# Patient Record
Sex: MALE | Race: WHITE
[De-identification: names, ages, dates, MRNs, and addresses within clinical notes are randomized per-mention and may not be internally consistent; named-entity substitution may affect disease eponyms.]

---

## 2018-11-21 ENCOUNTER — HOSPITAL ENCOUNTER (OUTPATIENT)
Dept: HOSPITAL 47 - RADUSMAIN | Age: 78
Discharge: HOME | End: 2018-11-21
Attending: PHYSICIAN ASSISTANT
Payer: MEDICARE

## 2018-11-21 DIAGNOSIS — I80.9: Primary | ICD-10-CM

## 2018-11-21 DIAGNOSIS — T84.039D: ICD-10-CM

## 2018-11-21 DIAGNOSIS — M25.551: ICD-10-CM

## 2018-11-21 DIAGNOSIS — M79.89: ICD-10-CM

## 2018-11-21 DIAGNOSIS — Z96.652: ICD-10-CM

## 2018-12-04 ENCOUNTER — HOSPITAL ENCOUNTER (OUTPATIENT)
Dept: HOSPITAL 47 - LABPAT | Age: 78
Discharge: HOME | End: 2018-12-04
Attending: ORTHOPAEDIC SURGERY
Payer: MEDICARE

## 2018-12-04 DIAGNOSIS — Z01.818: Primary | ICD-10-CM

## 2018-12-04 DIAGNOSIS — Z01.812: ICD-10-CM

## 2018-12-04 DIAGNOSIS — Z79.01: ICD-10-CM

## 2018-12-04 LAB
ALBUMIN SERPL-MCNC: 4 G/DL (ref 3.5–5)
ALP SERPL-CCNC: 73 U/L (ref 38–126)
ALT SERPL-CCNC: 22 U/L (ref 21–72)
ANION GAP SERPL CALC-SCNC: 8 MMOL/L
APTT BLD: 25.7 SEC (ref 22–30)
AST SERPL-CCNC: 24 U/L (ref 17–59)
BASOPHILS # BLD AUTO: 0 K/UL (ref 0–0.2)
BASOPHILS NFR BLD AUTO: 0 %
BUN SERPL-SCNC: 17 MG/DL (ref 9–20)
CALCIUM SPEC-MCNC: 9.4 MG/DL (ref 8.4–10.2)
CHLORIDE SERPL-SCNC: 105 MMOL/L (ref 98–107)
CO2 SERPL-SCNC: 28 MMOL/L (ref 22–30)
EOSINOPHIL # BLD AUTO: 0.1 K/UL (ref 0–0.7)
EOSINOPHIL NFR BLD AUTO: 3 %
ERYTHROCYTE [DISTWIDTH] IN BLOOD BY AUTOMATED COUNT: 4.52 M/UL (ref 4.3–5.9)
ERYTHROCYTE [DISTWIDTH] IN BLOOD: 13.3 % (ref 11.5–15.5)
GLUCOSE SERPL-MCNC: 88 MG/DL (ref 74–99)
HCT VFR BLD AUTO: 40.5 % (ref 39–53)
HGB BLD-MCNC: 13.2 GM/DL (ref 13–17.5)
INR PPP: 1.1 (ref ?–1.2)
LYMPHOCYTES # SPEC AUTO: 0.9 K/UL (ref 1–4.8)
LYMPHOCYTES NFR SPEC AUTO: 23 %
MCH RBC QN AUTO: 29.3 PG (ref 25–35)
MCHC RBC AUTO-ENTMCNC: 32.7 G/DL (ref 31–37)
MCV RBC AUTO: 89.6 FL (ref 80–100)
MONOCYTES # BLD AUTO: 0.2 K/UL (ref 0–1)
MONOCYTES NFR BLD AUTO: 6 %
NEUTROPHILS # BLD AUTO: 2.6 K/UL (ref 1.3–7.7)
NEUTROPHILS NFR BLD AUTO: 65 %
PH UR: 6.5 [PH] (ref 5–8)
PLATELET # BLD AUTO: 154 K/UL (ref 150–450)
POTASSIUM SERPL-SCNC: 4.2 MMOL/L (ref 3.5–5.1)
PROT SERPL-MCNC: 7 G/DL (ref 6.3–8.2)
PT BLD: 10.8 SEC (ref 9–12)
SODIUM SERPL-SCNC: 141 MMOL/L (ref 137–145)
SP GR UR: 1.02 (ref 1–1.03)
UROBILINOGEN UR QL STRIP: 2 MG/DL (ref ?–2)
WBC # BLD AUTO: 4 K/UL (ref 3.8–10.6)

## 2018-12-04 PROCEDURE — 86850 RBC ANTIBODY SCREEN: CPT

## 2018-12-04 PROCEDURE — 87070 CULTURE OTHR SPECIMN AEROBIC: CPT

## 2018-12-04 PROCEDURE — 86901 BLOOD TYPING SEROLOGIC RH(D): CPT

## 2018-12-04 PROCEDURE — 81003 URINALYSIS AUTO W/O SCOPE: CPT

## 2018-12-04 PROCEDURE — 93005 ELECTROCARDIOGRAM TRACING: CPT

## 2018-12-04 PROCEDURE — 85025 COMPLETE CBC W/AUTO DIFF WBC: CPT

## 2018-12-04 PROCEDURE — 85730 THROMBOPLASTIN TIME PARTIAL: CPT

## 2018-12-04 PROCEDURE — 80053 COMPREHEN METABOLIC PANEL: CPT

## 2018-12-04 PROCEDURE — 85610 PROTHROMBIN TIME: CPT

## 2018-12-04 PROCEDURE — 86900 BLOOD TYPING SEROLOGIC ABO: CPT

## 2018-12-11 ENCOUNTER — HOSPITAL ENCOUNTER (INPATIENT)
Dept: HOSPITAL 47 - 2ORMAIN | Age: 78
LOS: 3 days | Discharge: HOME HEALTH | DRG: 468 | End: 2018-12-14
Attending: ORTHOPAEDIC SURGERY | Admitting: ORTHOPAEDIC SURGERY
Payer: MEDICARE

## 2018-12-11 VITALS — BODY MASS INDEX: 30.4 KG/M2

## 2018-12-11 DIAGNOSIS — G30.1: ICD-10-CM

## 2018-12-11 DIAGNOSIS — Z86.718: ICD-10-CM

## 2018-12-11 DIAGNOSIS — E78.5: ICD-10-CM

## 2018-12-11 DIAGNOSIS — M19.91: ICD-10-CM

## 2018-12-11 DIAGNOSIS — I10: ICD-10-CM

## 2018-12-11 DIAGNOSIS — G25.81: ICD-10-CM

## 2018-12-11 DIAGNOSIS — Z79.899: ICD-10-CM

## 2018-12-11 DIAGNOSIS — H40.9: ICD-10-CM

## 2018-12-11 DIAGNOSIS — Z99.89: ICD-10-CM
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
## 2018-12-11 DIAGNOSIS — F02.80: ICD-10-CM

## 2018-12-11 DIAGNOSIS — Z86.19: ICD-10-CM

## 2018-12-11 DIAGNOSIS — M54.5: ICD-10-CM

## 2018-12-11 DIAGNOSIS — T84.030A: Primary | ICD-10-CM

## 2018-12-11 DIAGNOSIS — Z87.891: ICD-10-CM

## 2018-12-11 DIAGNOSIS — F32.9: ICD-10-CM

## 2018-12-11 DIAGNOSIS — G47.33: ICD-10-CM

## 2018-12-11 DIAGNOSIS — Z80.8: ICD-10-CM

## 2018-12-11 DIAGNOSIS — Z79.1: ICD-10-CM

## 2018-12-11 DIAGNOSIS — Z96.653: ICD-10-CM

## 2018-12-11 DIAGNOSIS — H91.90: ICD-10-CM

## 2018-12-11 DIAGNOSIS — Z85.46: ICD-10-CM

## 2018-12-11 DIAGNOSIS — K21.9: ICD-10-CM

## 2018-12-11 DIAGNOSIS — Z92.21: ICD-10-CM

## 2018-12-11 DIAGNOSIS — J44.9: ICD-10-CM

## 2018-12-11 DIAGNOSIS — E66.9: ICD-10-CM

## 2018-12-11 PROCEDURE — 86850 RBC ANTIBODY SCREEN: CPT

## 2018-12-11 PROCEDURE — 0SRR0JA REPLACEMENT OF RIGHT HIP JOINT, FEMORAL SURFACE WITH SYNTHETIC SUBSTITUTE, UNCEMENTED, OPEN APPROACH: ICD-10-PCS

## 2018-12-11 PROCEDURE — 94760 N-INVAS EAR/PLS OXIMETRY 1: CPT

## 2018-12-11 PROCEDURE — 30233N0 TRANSFUSION OF AUTOLOGOUS RED BLOOD CELLS INTO PERIPHERAL VEIN, PERCUTANEOUS APPROACH: ICD-10-PCS

## 2018-12-11 PROCEDURE — 88331 PATH CONSLTJ SURG 1 BLK 1SPC: CPT

## 2018-12-11 PROCEDURE — 87070 CULTURE OTHR SPECIMN AEROBIC: CPT

## 2018-12-11 PROCEDURE — 5A09457 ASSISTANCE WITH RESPIRATORY VENTILATION, 24-96 CONSECUTIVE HOURS, CONTINUOUS POSITIVE AIRWAY PRESSURE: ICD-10-PCS

## 2018-12-11 PROCEDURE — 73501 X-RAY EXAM HIP UNI 1 VIEW: CPT

## 2018-12-11 PROCEDURE — 85025 COMPLETE CBC W/AUTO DIFF WBC: CPT

## 2018-12-11 PROCEDURE — 87075 CULTR BACTERIA EXCEPT BLOOD: CPT

## 2018-12-11 PROCEDURE — 94640 AIRWAY INHALATION TREATMENT: CPT

## 2018-12-11 PROCEDURE — 0SPR0JZ REMOVAL OF SYNTHETIC SUBSTITUTE FROM RIGHT HIP JOINT, FEMORAL SURFACE, OPEN APPROACH: ICD-10-PCS

## 2018-12-11 PROCEDURE — 86891 AUTOLOGOUS BLOOD OP SALVAGE: CPT

## 2018-12-11 PROCEDURE — 88305 TISSUE EXAM BY PATHOLOGIST: CPT

## 2018-12-11 PROCEDURE — 87205 SMEAR GRAM STAIN: CPT

## 2018-12-11 PROCEDURE — 86901 BLOOD TYPING SEROLOGIC RH(D): CPT

## 2018-12-11 PROCEDURE — 86900 BLOOD TYPING SEROLOGIC ABO: CPT

## 2018-12-11 PROCEDURE — 80048 BASIC METABOLIC PNL TOTAL CA: CPT

## 2018-12-11 RX ADMIN — DOXAZOSIN MESYLATE SCH MG: 2 TABLET ORAL at 23:08

## 2018-12-11 RX ADMIN — ONDANSETRON ONE: 2 INJECTION INTRAMUSCULAR; INTRAVENOUS at 17:34

## 2018-12-11 RX ADMIN — DOCUSATE SODIUM AND SENNOSIDES SCH EACH: 50; 8.6 TABLET ORAL at 23:10

## 2018-12-11 RX ADMIN — DEXTROSE ONE: 50 INJECTION, SOLUTION INTRAVENOUS at 17:34

## 2018-12-11 RX ADMIN — MELOXICAM ONE: 7.5 TABLET ORAL at 17:34

## 2018-12-11 RX ADMIN — MEMANTINE HYDROCHLORIDE SCH MG: 10 TABLET ORAL at 23:08

## 2018-12-11 RX ADMIN — ACETAMINOPHEN ONE: 500 TABLET ORAL at 17:34

## 2018-12-11 RX ADMIN — GABAPENTIN SCH MG: 300 CAPSULE ORAL at 23:09

## 2018-12-11 RX ADMIN — DULOXETINE SCH MG: 60 CAPSULE, DELAYED RELEASE ORAL at 23:08

## 2018-12-11 RX ADMIN — ONDANSETRON ONE MG: 2 INJECTION INTRAMUSCULAR; INTRAVENOUS at 12:42

## 2018-12-11 RX ADMIN — MELOXICAM ONE MG: 7.5 TABLET ORAL at 12:28

## 2018-12-11 RX ADMIN — POTASSIUM CHLORIDE SCH MEQ: 750 TABLET, EXTENDED RELEASE ORAL at 23:08

## 2018-12-11 RX ADMIN — PRAVASTATIN SODIUM SCH MG: 40 TABLET ORAL at 23:08

## 2018-12-11 RX ADMIN — DEXTROSE ONE MG: 50 INJECTION, SOLUTION INTRAVENOUS at 12:42

## 2018-12-11 RX ADMIN — PRAMIPEXOLE DIHYDROCHLORIDE SCH MG: 1 TABLET ORAL at 23:08

## 2018-12-11 RX ADMIN — ACETAMINOPHEN ONE MG: 500 TABLET ORAL at 12:28

## 2018-12-11 RX ADMIN — POTASSIUM CHLORIDE SCH MLS: 14.9 INJECTION, SOLUTION INTRAVENOUS at 12:24

## 2018-12-11 RX ADMIN — CEFAZOLIN SCH: 330 INJECTION, POWDER, FOR SOLUTION INTRAMUSCULAR; INTRAVENOUS at 19:34

## 2018-12-11 NOTE — XR
Right femur

 

HISTORY: Hardware placement

 

2 views of the right femur are submitted correlated prior exam and same dated earlier time.

 

Similarly, mid to distal femur is included, proximal femur not included on the exam. There is some mo
tion present on the frontal view.

 

Stem is partially visualized within the proximal femur. Cortical thickening the proximal femoral diap
hysis is noted. Lucency in the soft tissues is noted. There is soft tissue calcification some of whic
h are likely vascular. Patient shows right knee arthroplasty change.

 

IMPRESSION: Orthopedic localization.

## 2018-12-11 NOTE — P.OP
Date of Procedure: 12/11/18


Preoperative Diagnosis: 


Aseptic loosening right femoral stem


Postoperative Diagnosis: 


Aseptic loosening right femoral stem


Procedure(s) Performed: 


Revision right total hip arthroplasty


Implants: 


[employer] Redapt femoral standard offset revision stem, size 17 x 240 mm


[employer] Oxinium femoral head 36 m, +0


All components were press-fit.


The articulation is Oxinium on polyethylene.


Anesthesia: spinal


Surgeon: Gabriel Kim


Assistant #1: An Trejo


Estimated Blood Loss (ml): 600 (266 mL returned with Cell Saver)


Pathology: other (Cultures 2.  Frozen section)


Condition: stable


Disposition: PACU


Indications for Procedure: 


This is an 78-year-old gentleman that has had a right total hip arthroplasty 

performed in the past.  He presented the office with increased pain in his 

thigh and his x-rays demonstrated aseptic loosening of his right femoral stem.  

After discussing the surgical nonsurgical treatment options with him at length, 

I recommended a revision of his right total hip arthroplasty with removal was 

femoral stem and placement of a revision stem.  Informed consent was obtained.


Operative Findings: 


The operative findings are consistent with loosening of the right femoral stem.


Description of Procedure: 


Patient was seen and evaluated in the preoperative area, consent was reviewed, 

and the surgical site was marked with a skin marker.  Patient was then brought 

to the operating room and given prophylactic antibiotics intravenously.  1 g of 

Tranexamic acid was also given.  A spinal anesthetic was administered by the 

anesthesia department.   The patient was then placed on the operative table and 

placed in the lateral decubitus position with the bony prominences well-padded.

  The hip area was then prepped and draped in usual sterile fashion.





A universal timeout was then performed, which confirmed the patient's name, 

surgical site, ALLERGIES, and procedure being performed.  Next the incision 

site was located in the lateral aspect of the hip, centered at the tip of the 

greater trochanter with the prior scar being excised.  The skin and 

subcutaneous tissues were sharply incised.  Incision was carefully dissected 

down to the fascia.  This fascia was then incised in line with the incision.  A 

large amount of clear fluid was encountered and this was cultured 2 .  Next, a 

Charnley retractor was then placed in the abductors were identified.  The 

anterior one third of the abductors was released off the trochanter and one 

large sleeve.  The anterior hip capsule was then exposed.  The capsule was then 

opened.  The proximal femur was then visualized.  The hip was then gently 

dislocated.





The femoral head was then removed.  Next, the femoral stem was found to be 

grossly loose and was easily removed.  The acetabulum was then inspected, and 

found to be intact with no evidence of loosening or significant polyethylene 

wear.





Attention was then directed to the femur.  The proximal femur was reexposed.  

Retractors were then placed.  A box osteotome was used to lateralize the 

proximal femur.  A  was then used to locate the femoral canal.  A 

pedestal was encountered distally.  A long drill bit was then used to get past 

the pedestal.  A ball-tipped guidewire was then passed distally in the femur 

and this was confirmed with a portable x-ray.  Flexible reamers were then used 

to open up the femoral canal.  Sequential reaming was then performed with 

appropriate size which afforded excellent fixation in the femur.  The proximal 

reamer was then used to open up the proximal femur to accommodate the implant.  

A trial was then placed with appropriate head and neck, and the hip was gently 

reduced.  The leg lengths were checked and found to be equal.  Hip was then 

taken through full range of motion, was stable throughout.  Another portal x-

ray was obtained, which confirmed that the trial was within the femoral canal.  

Next, the hip was gently dislocated, and the trials were removed.  Final 

implants were then impacted and the hip was again reduced.  The leg lengths 

were again examined and found to be equal.  The hip was also taken through 

range of motion, and found to be stable.





The hip was then copiously irrigated with antibiotic solution with pulsatile 

lavage.  The hip was then irrigated with Irrisept solution.  The soft tissues 

were then injected with ropivacaine solution.  A second dose of 1 g of 

Tranexamic acid was given.





The abductors were then repaired with #5 Ethibond suture with drill holes to 

the bone.





The fascia was closed with #2 strata fix suture.  The subcutaneous tissue was 

closed with 3-0 Vicryl.  The subcuticular tissue was closed with staples.  The 

skin was then covered with a silver dressing.  The patient was then transferred 

to the recovery room in stable condition.





The Asst.MOHSEN Rich was required due to the complexity of surgery, and 

the need for skilled surgical assistant for positioning, draping, exposure, 

retraction, and closure of the wound.and closure of the wound.

## 2018-12-11 NOTE — XR
Right femur

 

HISTORY: Orthopedic planning, hardware placement

 

Single lateral view of the right femur submitted.

 

Lateral view shows medullary metallic shira overlying the mid to distal diaphyseal the distal metaphyse
al right femur likely representing drill bit. Patient shows knee arthroplasty change.

 

IMPRESSION: Orthopedic localization.

## 2018-12-11 NOTE — XR
PROCEDURE: XR Hip Limited RT - 1V

DATE AND TIME: 12/11/2018 4:49 PM

 

CLINICAL INDICATION: PHH; Status post hip surgery, assess surgical alignment

 

TECHNIQUE: AP view

 

COMPARISON: None

 

FINDINGS: Post procedural AP view shows anatomic positioning and alignment of the THR. No unexpected 
findings.

 

IMPRESSION:

Postoperative AP examination.

## 2018-12-11 NOTE — XR
Right femur

 

HISTORY: Hardware placement, arthroplasty

 

2 views of the right femur submitted. Correlation to right hip dated 12/10/2015

 

Proximal femur is not included on exam. The mid to distal right femur is shown with right knee arthro
plasty change. Femoral stem of hip arthroplasty is partially visualized, there is some cortical thick
ening at the proximal diaphyseal right femur. Questionable lucency along the medial proximal to mid d
iaphyseal cortex is seen associated with the cortical thickening and also peripheral to this at the m
edial aspect. There is lucency in the soft tissues compatible with postop state. Vascular calcificati
ons are noted incidentally, there are soft tissue constipations.

 

IMPRESSION: Orthopedic localization.

## 2018-12-11 NOTE — CONS
CONSULTATION



DATE OF SERVICE:

12/11/2018



REASON FOR CONSULTATION:

Advice regarding hypertension and other multiple medical issues, requested by Dr. Kim.



HISTORY OF PRESENT ILLNESS:

This 78-year-old gentleman with a past medical history of COPD, dementia, history of

GERD, hypertension, history of liver disease, memory impairment, sleep apnea, on CPAP,

hepatitis B, history of back surgery, DJD, being followed by Dr. Etelvina Vázquez in the

outpatient setting, underwent revision of right total hip joint arthroplasty for

aseptic loosening of the right femoral stem.  The patient tolerated the procedure well.

Patient is slightly confused.  Otherwise there is no history of any fever or rigors, no

history of chest pain, palpitation, headache, loss of consciousness, seizures at this

time.



PAST MEDICAL HISTORY:

1. COPD.

2. Dementia.

3. GERD.

4. Hypertension.

5. Liver disease.

6. Memory impairment.

7. DJD.

8. Sleep apnea.

9. Hepatitis B.

10.Back surgery.



HOME MEDICATIONS:

1. Ultram 50 mg q.6 p.r.n.

2. Pravachol 20 mg at bedtime.

3. Mirapex 1 mg p.o. t.i.d.

4. K-Dur 10 mEq p.o. at bedtime.

5. Omeprazole 40 mg p.o. daily.

6. Namenda 10 mg p.o. b.i.d.

7. Neurontin 300 mg t.i.d.

8. Lasix 40 mg p.o. daily.

9. Iron 325 mg p.o. daily.

10.Cardura 2 mg p.o. at bedtime.

11.Cymbalta 60 mg p.o. b.i.d.

12.Celebrex 200 mg p.o. daily.

13.Ventolin HFA 1 to 2 puffs q.6 p.r.n.



ALLERGIES:

NONE.



FAMILY HISTORY:

History of skin cancer in the family.



SOCIAL HISTORY:

Previous history of smoking.  No current smoking or alcohol intake.



REVIEW OF SYSTEMS:

ENT: Diminished hearing. Diminished vision.

CARDIOVASCULAR SYSTEM: No angina, palpitations.

RESPIRATORY SYSTEM: No cough, hemoptysis.

GI: No nausea, vomiting.

: No dysuria or retention.

NERVOUS SYSTEM: As mentioned earlier.

ALLERGY/IMMUNOLOGY: No asthma, hayfever.

MUSCULOSKELETAL: As mentioned earlier.

HEMATOLOGY/ONCOLOGY: As mentioned earlier.

ENDOCRINE: No history of diabetes, hypothyroidism.

CONSTITUTIONAL: As mentioned earlier.

DERMATOLOGY: Negative.

RHEUMATOLOGY: Negative.

PSYCHIATRY: As mentioned earlier.



PHYSICAL EXAMINATION:

Patient alert and oriented x2. Pulse 68, blood pressure 125/58, respiration 16,

temperature 97 degrees, pulse ox 94% on 2 L.

HEENT: Conjunctivae normal. Oral mucosa moist.

NECK: No jugular venous distention. No carotid bruit. No lymph node enlargement.

CARDIOVASCULAR SYSTEM:  S1, S2 muffled.

RESPIRATORY SYSTEM: Breath sounds diminished at the bases.  A few scattered rhonchi. No

crackles.

ABDOMEN: Soft, non-tender. No mass palpable.

LEGS: Status post surgery.

NERVOUS SYSTEM: Higher functions as mentioned earlier. Moves all 4 limbs. No focal

motor or sensory deficit.

LYMPHATICS: No lymph node palpable in neck, axillae or groin.

SKIN: No ulcer, rash, bleeding.



LABS:

CBC within normal limits.  Coags are normal.  CMP within normal limits.



ASSESSMENT:

1. Status post revision right total hip joint arthroplasty for aseptic loosening of

    the right femoral stem.

2. History of chronic obstructive pulmonary disease.

3. Dementia.

4. Gastroesophageal reflux disease.

5. Hypertension.

6. History of liver disease.

7. Memory impairment.

8. History of degenerative joint disease.

9. History of prostate disorder.

10.Sleep apnea, on CPAP probably at 7.

11.History of hepatitis B.

12.History of right rotator cuff fracture.

13.Restless legs syndrome.

14.Degenerative joint disease.

15.Remote history of nicotine dependence.



RECOMMENDATIONS AND DISCUSSION:

In this 78-year-old gentleman who presented with multiple complex medical issues, we

will monitor the patient closely, continue the current management, continue symptomatic

treatment. I recommend resuming the home medications. Continue the CPAP.  DVT

prophylaxis.  Incentive spirometry.  Symptomatic treatment may be provided.  Will

follow the patient closely with you.



Thank you, Dr. Kim, for letting us participate in the care of this patient.



Electrolytes may be checked. Patient may be asked to follow with Dr. Etelvina Vázquez,

primary physician, closely after discharge.





MMODL / IJN: [id number] / Job#: [id number]

## 2018-12-12 VITALS — RESPIRATION RATE: 16 BRPM

## 2018-12-12 LAB
ANION GAP SERPL CALC-SCNC: 6 MMOL/L
BASOPHILS # BLD AUTO: 0 K/UL (ref 0–0.2)
BASOPHILS NFR BLD AUTO: 0 %
BUN SERPL-SCNC: 23 MG/DL (ref 9–20)
CALCIUM SPEC-MCNC: 9.1 MG/DL (ref 8.4–10.2)
CHLORIDE SERPL-SCNC: 104 MMOL/L (ref 98–107)
CO2 SERPL-SCNC: 27 MMOL/L (ref 22–30)
EOSINOPHIL # BLD AUTO: 0 K/UL (ref 0–0.7)
EOSINOPHIL NFR BLD AUTO: 0 %
ERYTHROCYTE [DISTWIDTH] IN BLOOD BY AUTOMATED COUNT: 4 M/UL (ref 4.3–5.9)
ERYTHROCYTE [DISTWIDTH] IN BLOOD: 13.2 % (ref 11.5–15.5)
GLUCOSE SERPL-MCNC: 104 MG/DL (ref 74–99)
HCT VFR BLD AUTO: 35.7 % (ref 39–53)
HGB BLD-MCNC: 11.6 GM/DL (ref 13–17.5)
LYMPHOCYTES # SPEC AUTO: 1.1 K/UL (ref 1–4.8)
LYMPHOCYTES NFR SPEC AUTO: 14 %
MCH RBC QN AUTO: 29 PG (ref 25–35)
MCHC RBC AUTO-ENTMCNC: 32.5 G/DL (ref 31–37)
MCV RBC AUTO: 89.2 FL (ref 80–100)
MONOCYTES # BLD AUTO: 0.4 K/UL (ref 0–1)
MONOCYTES NFR BLD AUTO: 6 %
NEUTROPHILS # BLD AUTO: 5.9 K/UL (ref 1.3–7.7)
NEUTROPHILS NFR BLD AUTO: 79 %
PLATELET # BLD AUTO: 168 K/UL (ref 150–450)
POTASSIUM SERPL-SCNC: 4.5 MMOL/L (ref 3.5–5.1)
SODIUM SERPL-SCNC: 137 MMOL/L (ref 137–145)
WBC # BLD AUTO: 7.6 K/UL (ref 3.8–10.6)

## 2018-12-12 RX ADMIN — RIVAROXABAN SCH MG: 10 TABLET, FILM COATED ORAL at 09:23

## 2018-12-12 RX ADMIN — POTASSIUM CHLORIDE SCH MEQ: 750 TABLET, EXTENDED RELEASE ORAL at 20:39

## 2018-12-12 RX ADMIN — DOCUSATE SODIUM AND SENNOSIDES SCH EACH: 50; 8.6 TABLET ORAL at 20:39

## 2018-12-12 RX ADMIN — PRAVASTATIN SODIUM SCH MG: 40 TABLET ORAL at 20:38

## 2018-12-12 RX ADMIN — MEMANTINE HYDROCHLORIDE SCH MG: 10 TABLET ORAL at 09:23

## 2018-12-12 RX ADMIN — HYDROCODONE BITARTRATE AND ACETAMINOPHEN PRN EACH: 5; 325 TABLET ORAL at 20:37

## 2018-12-12 RX ADMIN — POTASSIUM CHLORIDE SCH: 14.9 INJECTION, SOLUTION INTRAVENOUS at 06:40

## 2018-12-12 RX ADMIN — CEFAZOLIN SCH GM: 10 INJECTION, POWDER, FOR SOLUTION INTRAVENOUS at 09:24

## 2018-12-12 RX ADMIN — GABAPENTIN SCH MG: 300 CAPSULE ORAL at 20:39

## 2018-12-12 RX ADMIN — PRAMIPEXOLE DIHYDROCHLORIDE SCH MG: 1 TABLET ORAL at 09:23

## 2018-12-12 RX ADMIN — GABAPENTIN SCH MG: 300 CAPSULE ORAL at 09:24

## 2018-12-12 RX ADMIN — GABAPENTIN SCH MG: 300 CAPSULE ORAL at 15:39

## 2018-12-12 RX ADMIN — PANTOPRAZOLE SODIUM SCH MG: 40 TABLET, DELAYED RELEASE ORAL at 09:23

## 2018-12-12 RX ADMIN — MEMANTINE HYDROCHLORIDE SCH MG: 10 TABLET ORAL at 20:38

## 2018-12-12 RX ADMIN — CEFAZOLIN SCH GM: 10 INJECTION, POWDER, FOR SOLUTION INTRAVENOUS at 00:02

## 2018-12-12 RX ADMIN — DULOXETINE SCH MG: 60 CAPSULE, DELAYED RELEASE ORAL at 20:39

## 2018-12-12 RX ADMIN — Medication SCH MG: at 09:23

## 2018-12-12 RX ADMIN — HYDROCODONE BITARTRATE AND ACETAMINOPHEN PRN EACH: 5; 325 TABLET ORAL at 09:21

## 2018-12-12 RX ADMIN — FUROSEMIDE SCH MG: 40 TABLET ORAL at 09:23

## 2018-12-12 RX ADMIN — PRAMIPEXOLE DIHYDROCHLORIDE SCH MG: 1 TABLET ORAL at 15:38

## 2018-12-12 RX ADMIN — PRAMIPEXOLE DIHYDROCHLORIDE SCH MG: 1 TABLET ORAL at 20:38

## 2018-12-12 RX ADMIN — CEFAZOLIN SCH MLS/HR: 330 INJECTION, POWDER, FOR SOLUTION INTRAMUSCULAR; INTRAVENOUS at 09:25

## 2018-12-12 RX ADMIN — RIVAROXABAN SCH: 10 TABLET, FILM COATED ORAL at 09:24

## 2018-12-12 RX ADMIN — DULOXETINE SCH MG: 60 CAPSULE, DELAYED RELEASE ORAL at 09:23

## 2018-12-12 RX ADMIN — CEFAZOLIN SCH: 330 INJECTION, POWDER, FOR SOLUTION INTRAMUSCULAR; INTRAVENOUS at 06:40

## 2018-12-12 NOTE — P.PN
Subjective


Progress Note Date: 12/12/18


This is an 78-year-old male who is status post revision right total hip 

arthroplasty.  This is postoperative day #1 and patient is seen and evaluated 

at bedside with Dr. Gabriel Kim.  Patient states that his pain is well 

controlled this morning.  Patient states that he has not been out of bed with 

physical therapy yet.  Patient does complain of some lower back pain which he 

states is chronic for him. Patient denies any fever/chills, numbness, weakness, 

tingling, abdominal pain, shortness of breath or chest pain.








Objective





- Vital Signs


Vital signs: 


 Vital Signs











Temp  98.9 F   12/12/18 07:00


 


Pulse  78   12/12/18 07:32


 


Resp  18   12/12/18 07:00


 


BP  114/78   12/12/18 07:00


 


Pulse Ox  93 L  12/12/18 07:00








 Intake & Output











 12/11/18 12/12/18 12/12/18





 18:59 06:59 18:59


 


Intake Total 1901 360 150


 


Output Total 600 100 


 


Balance 1301 260 150


 


Weight 97.522 kg  


 


Intake:   


 


  IV 1901  


 


  Oral  360 150


 


Output:   


 


  Urine  100 


 


  Estimated Blood Loss 600  














- Exam


Vital signs are stable.  Patient is in no acute distress and is alert and 

oriented 3.  Calf is soft and nontender to palpation.  Dressing is clean, dry, 

and intact.  Patient has full foot and ankle motion without pain or difficulty.

  Neurovascular status and circulatory status are intact.  








- Labs


CBC & Chem 7: 


 12/12/18 07:45





Labs: 


 Abnormal Lab Results - Last 24 Hours (Table)











  12/12/18 Range/Units





  07:45 


 


RBC  4.00 L  (4.30-5.90)  m/uL


 


Hgb  11.6 L  (13.0-17.5)  gm/dL


 


Hct  35.7 L  (39.0-53.0)  %








 Microbiology - Last 24 Hours (Table)











 12/11/18 14:13 Gram Stain - Preliminary





 Hip - Right Wound Culture - Preliminary


 


 12/11/18 14:13 Anaerobic Culture - Preliminary





 Hip - Right 


 


 12/11/18 14:13 Anaerobic Culture - Preliminary





 Hip - Right 


 


 12/11/18 14:13 Wound Culture - Preliminary





 Hip - Right 














Assessment and Plan


Assessment: 





COPD


Dementia


GERD


Hypertension


Memory impairment


Sleep apnea


Liver disease


History of prostate disorder


Hepatitis B


Restless leg syndrome





(1) S/P revision of total hip


Current Visit: Yes   Status: Acute   Code(s): Z96.649 - PRESENCE OF UNSPECIFIED 

ARTIFICIAL HIP JOINT   SNOMED Code(s): 539865607


   





(2) Loose total hip arthroplasty


Current Visit: Yes   Status: Acute   Code(s): T84.038A - MECHANICAL LOOSENING 

OF OTH INTERNAL PROSTHETIC JOINT, INIT; Z96.649 - PRESENCE OF UNSPECIFIED 

ARTIFICIAL HIP JOINT   SNOMED Code(s): 244871132


   


Plan: 


1.  Continue routine postoperative care and pain control.


2.  DVT prophylaxis with Xarelto.


3.  Physical therapy today.


4.  Hip dislocation precautions and use of abductor pillow 6 weeks.


5.  Appreciate input from internal medicine.


6.  Anticipate discharge to [facility] on Friday.

## 2018-12-12 NOTE — PN
PROGRESS NOTE



DATE OF SERVICE:

12/12/2018



This is an 78-year-old gentleman who was admitted after revision of the right total

knee arthroplasty for septic loosening of the right femoral stem, is improving

significantly.  The patient is planning ECF rehab at this time.  Orthopedics are

following the patient. No chest pain.  No palpitations.  No fever.



PHYSICAL EXAM:

Alert and oriented x3.  Pulse blood pressure 114/70, respiration 18, temperature 98.2,

pulse ox 93% on room air.

HEENT:  Conjunctivae normal.

NECK:  No jugular venous distension. No rhonchi, no crackles.

ABDOMEN:  Soft, nontender. cardiac scars no suspicion the bases no rhonchi no crackles

abdomen is soft, nontender.  Legs status post diagnosis no focal deficits.



LABS:

WBC 7.2, hemoglobin is 11.6, glucose is 104.



ASSESSMENT:

1. Status post revision right total knee arthroplasty for septic loosening of the

    right femoral system.

2. History of chronic obstructive pulmonary disease.

3. Dementia.

4. Gastroesophageal reflux disease.

5. Hypertension.

6. History of liver disease.

7. History of memory impairment.

8. History of degenerative joint disease.

9. History of prostate disorder.

10.History of sleep apnea, on CPAP.

11.History of Hepatitis B.

12.History of right rotator cuff tear.

13.Restless legs syndrome.

14.Degenerative joint disease.

15.Remote history of nicotine dependence.



RECOMMENDATION:

1. Recommend to continue current management and symptomatic treatment, otherwise

    resume the home medications.

2. DVT prophylaxis.

3. Closely follow with Dr. Hernandez, PT, OT evaluation, possible ECF rehab.  Further

    recommendations to follow.





MMODL / IJN: [id number] / Job#: [id number]

## 2018-12-13 RX ADMIN — GABAPENTIN SCH MG: 300 CAPSULE ORAL at 15:10

## 2018-12-13 RX ADMIN — DULOXETINE SCH MG: 60 CAPSULE, DELAYED RELEASE ORAL at 09:08

## 2018-12-13 RX ADMIN — GABAPENTIN SCH MG: 300 CAPSULE ORAL at 09:08

## 2018-12-13 RX ADMIN — GABAPENTIN SCH MG: 300 CAPSULE ORAL at 23:32

## 2018-12-13 RX ADMIN — RIVAROXABAN SCH MG: 10 TABLET, FILM COATED ORAL at 09:08

## 2018-12-13 RX ADMIN — FUROSEMIDE SCH MG: 40 TABLET ORAL at 09:08

## 2018-12-13 RX ADMIN — Medication SCH MG: at 09:08

## 2018-12-13 RX ADMIN — DULOXETINE SCH MG: 60 CAPSULE, DELAYED RELEASE ORAL at 20:42

## 2018-12-13 RX ADMIN — POTASSIUM CHLORIDE SCH MEQ: 750 TABLET, EXTENDED RELEASE ORAL at 20:42

## 2018-12-13 RX ADMIN — POTASSIUM CHLORIDE SCH: 14.9 INJECTION, SOLUTION INTRAVENOUS at 07:30

## 2018-12-13 RX ADMIN — MEMANTINE HYDROCHLORIDE SCH MG: 10 TABLET ORAL at 09:08

## 2018-12-13 RX ADMIN — MEMANTINE HYDROCHLORIDE SCH MG: 10 TABLET ORAL at 20:42

## 2018-12-13 RX ADMIN — DOCUSATE SODIUM AND SENNOSIDES SCH EACH: 50; 8.6 TABLET ORAL at 20:42

## 2018-12-13 RX ADMIN — CEFAZOLIN SCH MLS/HR: 330 INJECTION, POWDER, FOR SOLUTION INTRAMUSCULAR; INTRAVENOUS at 09:10

## 2018-12-13 RX ADMIN — PRAMIPEXOLE DIHYDROCHLORIDE SCH MG: 1 TABLET ORAL at 09:08

## 2018-12-13 RX ADMIN — PRAVASTATIN SODIUM SCH MG: 40 TABLET ORAL at 20:42

## 2018-12-13 RX ADMIN — PRAMIPEXOLE DIHYDROCHLORIDE SCH MG: 1 TABLET ORAL at 15:10

## 2018-12-13 RX ADMIN — PRAMIPEXOLE DIHYDROCHLORIDE SCH MG: 1 TABLET ORAL at 23:32

## 2018-12-13 RX ADMIN — PANTOPRAZOLE SODIUM SCH MG: 40 TABLET, DELAYED RELEASE ORAL at 09:08

## 2018-12-13 RX ADMIN — DOXAZOSIN MESYLATE SCH: 2 TABLET ORAL at 02:35

## 2018-12-13 RX ADMIN — DOXAZOSIN MESYLATE SCH: 2 TABLET ORAL at 23:31

## 2018-12-13 NOTE — PN
PROGRESS NOTE



DATE OF SERVICE:

December 13, 2018.



PRESENTING COMPLAINT:

Right hip surgery.



INTERVAL HISTORY:

Patient is status post revision right total hip arthroplasty for loosening of femur

component.  Pain is controlled.  No nausea, vomiting.  No chest pain.  Tolerating a

diet.



REVIEW OF SYSTEMS:

Done for constitutional, cardiovascular, GI, pulmonary and relevant findings as above.



CURRENT MEDICATIONS:

Reviewed that include Xarelto.



PHYSICAL EXAMINATION:

VITAL SIGNS: Temperature 98, pulse 89.  Respirations 16, blood pressure 116/70, pulse

ox 96 percent on room air.

GENERAL APPEARANCE:  Sitting up, comfortable.

EYES: Pupils equal. Conjunctivae normal.

HEENT:  External appearance of nose and ears normal.  Oral cavity normal.

NECK:  JVD not raised. Mass not palpable.

RESPIRATORY: Effort normal.

LUNGS:  Fair air entry.

CARDIOVASCULAR: First and second sounds normal. No edema.

ABDOMEN:  Soft, nontender.  Liver and spleen not palpable.

PSYCHIATRY: Alert and oriented x3.  Mood and affect normal.



INVESTIGATIONS:

White count 7.6, hemoglobin 11.6, potassium 4.5.



ASSESSMENT:

1. Revision right total hip arthroplasty.

2. Chronic obstructive pulmonary disease.

3. Gastroesophageal reflux disease.

4. Essential hypertension.

5. Primary osteoarthritis.

6. Restless legs syndrome.

7. Obstructive sleep apnea uses CPAP machine.

8. History of prostate cancer with chemotherapy in the past.

9. Obesity BMI 30.4.

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



PLAN:

Continue current medication and treatment plan.  Care was discussed with the patient.



Thank you Dr. Kim.





MMFANL / BRIIN: [id number] / Job#: [id number]

## 2018-12-13 NOTE — P.PN
Subjective


Progress Note Date: 12/13/18


This is an 78-year-old male who is status post revision right total hip 

arthroplasty.  This is postoperative day #2 and patient is seen and evaluated 

at bedside with Dr. Gabriel Kim.  Patient states that his pain is well 

controlled this morning.  Per nursing staff, there has been some drainage from 

his incision. Patient denies any fever/chills, numbness, weakness, tingling, 

abdominal pain, shortness of breath or chest pain.








Objective





- Vital Signs


Vital signs: 


 Vital Signs











Temp  98.4 F   12/13/18 07:00


 


Pulse  79   12/13/18 07:00


 


Resp  16   12/13/18 07:00


 


BP  136/81   12/13/18 07:00


 


Pulse Ox  93 L  12/13/18 07:00








 Intake & Output











 12/12/18 12/13/18 12/13/18





 18:59 06:59 18:59


 


Intake Total 2400  


 


Output Total 800 500 


 


Balance 1600 -500 


 


Intake:   


 


  Intake, IV Titration 1300  





  Amount   


 


    Sodium Chloride 0.9% 1, 1300  





    000 ml @ 65 mls/hr IV .   





    [id number] ANJUM Rx#:[id number]   


 


  Oral 1100  


 


Output:   


 


  Urine 800 500 


 


Other:   


 


  # Voids 2  














- Exam


Vital signs are stable.  Patient is in no acute distress and is alert and 

oriented 3.  Calf is soft and nontender to palpation.  Dressing is clean, dry, 

and intact. No active drainage. Patient has full foot and ankle motion without 

pain or difficulty.  Neurovascular status and circulatory status are intact.  








- Labs


CBC & Chem 7: 


 12/12/18 07:45





 12/12/18 07:45


Labs: 


 Microbiology - Last 24 Hours (Table)











 12/11/18 14:13 Gram Stain - Preliminary





 Hip - Right Wound Culture - Preliminary


 


 12/11/18 14:13 Gram Stain - Preliminary





 Hip - Right Wound Culture - Preliminary














Assessment and Plan


Assessment: 





COPD


Dementia


GERD


Hypertension


Memory impairment


Sleep apnea


Liver disease


History of prostate disorder


Hepatitis B


Restless leg syndrome





(1) S/P revision of total hip


Current Visit: Yes   Status: Acute   Code(s): Z96.649 - PRESENCE OF UNSPECIFIED 

ARTIFICIAL HIP JOINT   SNOMED Code(s): 093804583


   





(2) Loose total hip arthroplasty


Current Visit: Yes   Status: Acute   Code(s): T84.038A - MECHANICAL LOOSENING 

OF OTH INTERNAL PROSTHETIC JOINT, INIT; Z96.649 - PRESENCE OF UNSPECIFIED 

ARTIFICIAL HIP JOINT   SNOMED Code(s): 689042024


   


Plan: 


1.  Continue routine postoperative care and pain control. Keep incision clean 

and dry.


2.  DVT prophylaxis with Xarelto.


3.  Physical therapy today.


4.  Hip dislocation precautions and use of abductor pillow 6 weeks.


5.  Appreciate input from internal medicine.


6.  Anticipate discharge home or to [facility] Friday.

## 2018-12-14 VITALS — HEART RATE: 79 BPM | DIASTOLIC BLOOD PRESSURE: 69 MMHG | TEMPERATURE: 97.3 F | SYSTOLIC BLOOD PRESSURE: 122 MMHG

## 2018-12-14 LAB
BASOPHILS # BLD AUTO: 0 K/UL (ref 0–0.2)
BASOPHILS NFR BLD AUTO: 0 %
EOSINOPHIL # BLD AUTO: 0.2 K/UL (ref 0–0.7)
EOSINOPHIL NFR BLD AUTO: 4 %
ERYTHROCYTE [DISTWIDTH] IN BLOOD BY AUTOMATED COUNT: 3.51 M/UL (ref 4.3–5.9)
ERYTHROCYTE [DISTWIDTH] IN BLOOD: 13 % (ref 11.5–15.5)
HCT VFR BLD AUTO: 31.2 % (ref 39–53)
HGB BLD-MCNC: 10.1 GM/DL (ref 13–17.5)
LYMPHOCYTES # SPEC AUTO: 1 K/UL (ref 1–4.8)
LYMPHOCYTES NFR SPEC AUTO: 18 %
MCH RBC QN AUTO: 28.9 PG (ref 25–35)
MCHC RBC AUTO-ENTMCNC: 32.5 G/DL (ref 31–37)
MCV RBC AUTO: 89 FL (ref 80–100)
MONOCYTES # BLD AUTO: 0.3 K/UL (ref 0–1)
MONOCYTES NFR BLD AUTO: 5 %
NEUTROPHILS # BLD AUTO: 3.9 K/UL (ref 1.3–7.7)
NEUTROPHILS NFR BLD AUTO: 71 %
PLATELET # BLD AUTO: 172 K/UL (ref 150–450)
WBC # BLD AUTO: 5.5 K/UL (ref 3.8–10.6)

## 2018-12-14 RX ADMIN — RIVAROXABAN SCH MG: 10 TABLET, FILM COATED ORAL at 08:32

## 2018-12-14 RX ADMIN — Medication SCH MG: at 08:32

## 2018-12-14 RX ADMIN — FUROSEMIDE SCH MG: 40 TABLET ORAL at 08:33

## 2018-12-14 RX ADMIN — PRAMIPEXOLE DIHYDROCHLORIDE SCH MG: 1 TABLET ORAL at 08:32

## 2018-12-14 RX ADMIN — MEMANTINE HYDROCHLORIDE SCH MG: 10 TABLET ORAL at 08:33

## 2018-12-14 RX ADMIN — CEFAZOLIN SCH: 330 INJECTION, POWDER, FOR SOLUTION INTRAMUSCULAR; INTRAVENOUS at 03:34

## 2018-12-14 RX ADMIN — DULOXETINE SCH MG: 60 CAPSULE, DELAYED RELEASE ORAL at 08:32

## 2018-12-14 RX ADMIN — PANTOPRAZOLE SODIUM SCH MG: 40 TABLET, DELAYED RELEASE ORAL at 08:32

## 2018-12-14 RX ADMIN — GABAPENTIN SCH MG: 300 CAPSULE ORAL at 08:32

## 2018-12-14 RX ADMIN — POTASSIUM CHLORIDE SCH: 14.9 INJECTION, SOLUTION INTRAVENOUS at 06:07

## 2018-12-14 NOTE — P.DS
Providers


Date of admission: 


12/11/18 10:28





Expected date of discharge: 12/14/18


Attending physician: 


Gabriel Kim





Consults: 





 





12/11/18 13:35


Consult Physician Routine 


   Consulting Provider: Etelvina Vázquez


   Consult Reason/Comments: medical management


   Do you want consulting provider notified?: Yes





12/11/18 16:51


Consult Physician Routine 


   Consulting Provider: Alban Kee


   Consult Reason/Comments: medical management


   Do you want consulting provider notified?: Yes











Primary care physician: 


Etelvina Vázquez








- Discharge Diagnosis(es)


(1) S/P revision of total hip


Current Visit: Yes   Status: Acute   





(2) Loose total hip arthroplasty


Current Visit: Yes   Status: Acute   


Hospital Course: 


This is an 78-year-old male who had a right total hip arthroplasty in December of 2016.  The patient presented for evaluation after developing increased pain 

in the right hip. Loosening of the femoral stem was seen on x-ray.  After 

discussion and consideration patient elects to proceed with revision right 

total hip arthroplasty.  The patient is seen preoperatively by Dr. Kim and 

medically cleared for surgery by their primary care physician.





Patient is admitted to [facility] on 12/11/2018 for revision right 

total hip arthroplasty.  The procedures performed without complication or 

sequelae.  The patient is doing well postoperatively.  Labs and vital signs are 

stable on day of discharge. Cultures are negative.





On day of discharge patient's hip incision is healing well.  There is minimal 

erythema and mild ecchymosis.  There is mild drainage noted at this time. 

Surgical clips are intact. There is minimal soft tissue swelling to the hip and 

thigh.  Patient has full foot and ankle motion without difficulty or pain.  

Neurovascular status to the right lower extremity is intact.  Patient is 

discharged home in good condition. Please see med rec for accurate list of home 

medications.








Plan - Discharge Summary


Discharge Rx Participant: Yes


New Discharge Prescriptions: 


New


   Cephalexin [Keflex] 500 mg PO Q6HR 10 Days #40 cap


   HYDROcodone/APAP 5-325MG [Norco 5-325] 1 - 2 tab PO Q4-6H PRN #84 tab


     PRN Reason: Pain


   Rivaroxaban [Xarelto] 10 mg PO DAILY #32 tab


   Sennosides [Senokot] 1 tab PO BID #60 tablet





No Action


   Gabapentin [Neurontin] 300 mg PO TID


   Doxazosin [Cardura] 2 mg PO HS


   Pramipexole [Mirapex] 1 mg PO TID


   Omeprazole 40 mg PO DAILY


   Furosemide [Lasix] 40 mg PO DAILY


   Albuterol Inhaler [Ventolin Hfa Inhaler] 1 - 2 puff INHALATION RT-Q6H PRN


     PRN Reason: sob


   DULoxetine HCL [Cymbalta] 60 mg PO BID


   Potassium Chloride ER [K-Dur 10] 10 meq PO HS


   traMADol HCl [Ultram] 50 mg PO Q6HR PRN


     PRN Reason: Pain


   Pravastatin Sodium [Pravachol] 20 mg PO HS


   Memantine [Namenda] 10 mg PO BID


   Celecoxib [CeleBREX] 200 mg PO DAILY


   Ferrous Sulfate [Iron] 325 mg PO DAILY


Discharge Medication List





Doxazosin [Cardura] 2 mg PO HS 12/12/15 [History]


Gabapentin [Neurontin] 300 mg PO TID 12/12/15 [History]


Albuterol Inhaler [Ventolin Hfa Inhaler] 1 - 2 puff INHALATION RT-Q6H PRN 04/19/ 16 [History]


Furosemide [Lasix] 40 mg PO DAILY 04/19/16 [History]


Omeprazole 40 mg PO DAILY 04/19/16 [History]


Pramipexole [Mirapex] 1 mg PO TID 04/19/16 [History]


DULoxetine HCL [Cymbalta] 60 mg PO BID 11/21/16 [History]


Potassium Chloride ER [K-Dur 10] 10 meq PO HS 11/21/16 [History]


Celecoxib [CeleBREX] 200 mg PO DAILY 12/07/18 [History]


Ferrous Sulfate [Iron] 325 mg PO DAILY 12/07/18 [History]


Memantine [Namenda] 10 mg PO BID 12/07/18 [History]


Pravastatin Sodium [Pravachol] 20 mg PO HS 12/07/18 [History]


traMADol HCl [Ultram] 50 mg PO Q6HR PRN 12/07/18 [History]


Cephalexin [Keflex] 500 mg PO Q6HR 10 Days #40 cap 12/14/18 [Rx]


HYDROcodone/APAP 5-325MG [Norco 5-325] 1 - 2 tab PO Q4-6H PRN #84 tab 12/14/18 [

Rx]


Rivaroxaban [Xarelto] 10 mg PO DAILY #32 tab 12/14/18 [Rx]


Sennosides [Senokot] 1 tab PO BID #60 tablet 12/14/18 [Rx]








Follow up Appointment(s)/Referral(s): 


[facility], [NON-STAFF] - As Needed


Etelvina Vázquez MD [Primary Care Provider] - 1 Week


Gabriel Kim DO [Doctor of Osteopathic Medicine] - 12/31/18 10:45 am


Activity/Diet/Wound Care/Special Instructions: 


Weightbearing as tolerated with walker.


May shower after 2 days if no drainage from the incision.


Daily dressing changes, or more frequently if drainage present. Keep incision 

clean and dry.


Continue use if abductor pillow x6 weeks.


Staples to be removed in 10-14 days.


Follow-up with [facility] in 2 weeks with any questions or concerns, 

please call with any questions or concerns, [phone]


Discharge Disposition: HOME WITH HOME HEALTH SERVICES

## 2018-12-15 NOTE — PN
PROGRESS NOTE



DATE OF SERVICE:

12/14/2018.



PRESENTING COMPLAINT:

Right hip surgery.



INTERVAL HISTORY:

This patient was seen by me on 12/14/2018, status post right hip surgery.  Overall

doing much better.  Pain is controlled.  No new issues.  Tolerating a diet.  Did work

with therapy.



REVIEW OF SYSTEMS:

Done for constitutional, cardiovascular, GI, pulmonary; relevant findings as above.



CURRENT MEDICATIONS:

Reviewed.



PHYSICAL EXAMINATION:

Temperature 97.3, pulse 99, respirations 18, blood pressure 122/69, pulse ox 93% room

air.

GENERAL APPEARANCE:  Sitting up, comfortable.

EYES: Pupils equal. Conjunctivae normal.

NECK: JVD not raised.  Mass not palpable.

Respiratory effort normal.

LUNGS: Clear.

CARDIOVASCULAR: First and second sounds. No edema.

ABDOMEN: Soft, nontender.  Liver and spleen not palpable.

PSYCHIATRY: Alert and oriented x3. Mood and affect normal.



INVESTIGATIONS:

White count 5.5, hemoglobin 10.1.



ASSESSMENT:

1. Revision right total hip arthroplasty.

2. Chronic obstructive pulmonary disease.

3. Gastroesophageal reflux disease.

4. Essential hypertension.

5. Primary osteoarthritis.

6. Restless legs syndrome.

7. Obstructive sleep apnea, uses CPAP machine.

8. History of prostate cancer with chemotherapy in the past.

9. Obesity; BMI 30.4.

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



PLAN:

Stable, continue medication and treatment plan.  Should follow up with the family

doctor.  Thank you Dr. Kim.





BETSEY / BRIIN: [id number] / Job#: [id number]

## 2019-01-22 ENCOUNTER — HOSPITAL ENCOUNTER (OUTPATIENT)
Dept: HOSPITAL 47 - LABPAT | Age: 79
Discharge: HOME | End: 2019-01-22
Payer: MEDICARE

## 2019-01-22 DIAGNOSIS — Z01.812: Primary | ICD-10-CM

## 2019-01-22 DIAGNOSIS — I10: ICD-10-CM

## 2019-01-22 DIAGNOSIS — R06.00: ICD-10-CM

## 2019-01-22 LAB
ANION GAP SERPL CALC-SCNC: 7 MMOL/L
BUN SERPL-SCNC: 16 MG/DL (ref 9–20)
CHLORIDE SERPL-SCNC: 102 MMOL/L (ref 98–107)
CO2 SERPL-SCNC: 30 MMOL/L (ref 22–30)
ERYTHROCYTE [DISTWIDTH] IN BLOOD BY AUTOMATED COUNT: 4.25 M/UL (ref 4.3–5.9)
ERYTHROCYTE [DISTWIDTH] IN BLOOD: 14.1 % (ref 11.5–15.5)
HCT VFR BLD AUTO: 37.4 % (ref 39–53)
HGB BLD-MCNC: 11.7 GM/DL (ref 13–17.5)
MCH RBC QN AUTO: 27.5 PG (ref 25–35)
MCHC RBC AUTO-ENTMCNC: 31.3 G/DL (ref 31–37)
MCV RBC AUTO: 88.1 FL (ref 80–100)
PLATELET # BLD AUTO: 243 K/UL (ref 150–450)
POTASSIUM SERPL-SCNC: 4.3 MMOL/L (ref 3.5–5.1)
SODIUM SERPL-SCNC: 139 MMOL/L (ref 137–145)
WBC # BLD AUTO: 4.2 K/UL (ref 3.8–10.6)

## 2019-01-22 PROCEDURE — 80051 ELECTROLYTE PANEL: CPT

## 2019-01-22 PROCEDURE — 84520 ASSAY OF UREA NITROGEN: CPT

## 2019-01-22 PROCEDURE — 82565 ASSAY OF CREATININE: CPT

## 2019-01-22 PROCEDURE — 85027 COMPLETE CBC AUTOMATED: CPT

## 2019-01-22 PROCEDURE — 36415 COLL VENOUS BLD VENIPUNCTURE: CPT

## 2019-01-31 ENCOUNTER — HOSPITAL ENCOUNTER (OUTPATIENT)
Dept: HOSPITAL 47 - CATHCVL | Age: 79
Discharge: HOME | End: 2019-01-31
Payer: COMMERCIAL

## 2019-01-31 VITALS — DIASTOLIC BLOOD PRESSURE: 76 MMHG | HEART RATE: 54 BPM | RESPIRATION RATE: 18 BRPM | SYSTOLIC BLOOD PRESSURE: 139 MMHG

## 2019-01-31 VITALS — BODY MASS INDEX: 29.7 KG/M2

## 2019-01-31 DIAGNOSIS — I10: ICD-10-CM

## 2019-01-31 DIAGNOSIS — I25.110: Primary | ICD-10-CM

## 2019-01-31 DIAGNOSIS — Z87.891: ICD-10-CM

## 2019-01-31 DIAGNOSIS — Z79.899: ICD-10-CM

## 2019-01-31 DIAGNOSIS — E78.5: ICD-10-CM

## 2019-01-31 PROCEDURE — 93458 L HRT ARTERY/VENTRICLE ANGIO: CPT

## 2019-01-31 NOTE — CC
CARDIAC CATHETERIZATION REPORT



DATE OF SERVICE:

January 31, 2019



PERFORMING PHYSICIAN:

Yovany King MD, interventional cardiologist.



PROCEDURE PERFORMED:

1. Selective right and left coronary angiogram.

2. Left heart catheterization.



INDICATION:

This is a pleasant 78-year-old gentleman with hypertension and dyslipidemia and history

of smoking, who underwent recently a stress test before hip surgery.  The stress test

showed apical ischemia.  Because of that, heart catheterization was advised.



APPROACH:

1. Right radial artery.

2. Right common femoral artery.



COMPLICATION:

None.



LEVEL OF SEDATION:

Moderate with sedation length of 27 minutes.



PROCEDURE DESCRIPTION:

After obtaining an informed consent, the patient was brought to cardiac cath lab.  I

accessed the right radial artery and I placed a 5-Ecuadorean sheath in the right radial

artery, but I could not advance the wire in the right elbow and because of that the

radial approach was aborted and I proceeded with the right femoral approach.  The right

common femoral artery was cannulated using micropuncture technique, the micropuncture

wire passed easily then I placed a 6-Ecuadorean sheath in the right common femoral artery.



Selective right and left coronary angiogram was performed using JR4 and JL4 catheter.

Left heart catheterization was performed using a pigtail catheter.  The procedure was

completed without any complication.



SELECTIVE CORONARY ANGIOGRAM:

1. The right coronary artery is a large caliber vessel and it is a dominant vessel.

    It has mild disease only in the midportion.  Distally it bifurcates into PDA and

    PLV branches both are angiographically normal.

2. The left main artery is angiographically normal. It bifurcates into left circumflex

    and left anterior descending artery.

3. The left circumflex is a large caliber vessel.  It is a nondominant vessel and

    appeared to be angiographically normal.

4. The LAD: The proximal LAD appeared to be angiographically normal.  It gives rise

    into the first and second diagonal branches, both are angiographically normal.  The

    mid LAD after the second diagonal appeared to have a tubular lesion in the range of

    60% to 70%.  The LAD distally appeared to be angiographically normal.



HEMODYNAMICS:

The left ventricular end-diastolic pressure appears to 8 mmHg without significant

gradient across the aortic valve.



CONCLUSION:

Intermediate to severe disease involving the mid LAD by the bifurcation of a large

diagonal branch.  The patient does have almost dual LAD system.



POSTPROCEDURE MANAGEMENT:

Giving the absence of any chest pain or chest discomfort at this point and the

bifurcation lesion, I did recommend maximized medical treatment.  If the patient

developed any symptoms of chest pain or discomfort, I will consider doing a PCI of the

LAD.





MMKJ / BRIIN: [id number] / Job#: [id number]

## 2019-12-31 ENCOUNTER — HOSPITAL ENCOUNTER (OUTPATIENT)
Dept: HOSPITAL 47 - RADXRMAIN | Age: 79
Discharge: HOME | End: 2019-12-31
Attending: DENTIST
Payer: MEDICARE

## 2019-12-31 DIAGNOSIS — J98.11: Primary | ICD-10-CM

## 2019-12-31 DIAGNOSIS — R91.8: ICD-10-CM

## 2019-12-31 PROCEDURE — 71046 X-RAY EXAM CHEST 2 VIEWS: CPT

## 2019-12-31 NOTE — XR
EXAMINATION TYPE: XR chest 2V

 

DATE OF EXAM: 12/31/2019

 

COMPARISON: Chest x-ray December 16, 2015

 

HISTORY: Possible ingested foreign body.

 

TECHNIQUE:  Frontal and lateral views of the chest are obtained.

 

FINDINGS:  There is new patchy left basilar opacity favoring atelectasis. Bilateral chronic parenchym
al change. Right lung is clear.  The cardiac silhouette size is within normal limits. There is ectati
c thoracic aorta.   The osseous structures are demineralized. There is suspected fairly advanced comp
ression fracture deformity at thoracolumbar junction on lateral view.

 

IMPRESSION:  Chronic changes with left basilar linear atelectasis. No suspicious metallic foreign bod
y or dental crown.

## 2020-06-12 ENCOUNTER — HOSPITAL ENCOUNTER (OUTPATIENT)
Dept: HOSPITAL 47 - RADXRMAIN | Age: 80
Discharge: HOME | End: 2020-06-12
Attending: FAMILY MEDICINE
Payer: MEDICARE

## 2020-06-12 DIAGNOSIS — R06.2: Primary | ICD-10-CM

## 2020-06-12 PROCEDURE — 71046 X-RAY EXAM CHEST 2 VIEWS: CPT

## 2020-06-12 NOTE — XR
EXAMINATION TYPE: XR chest 2V

 

DATE OF EXAM: 6/12/2020

 

COMPARISON: Prior chest x-ray dated 3/20/2020, 12/31/2019

 

HISTORY: Wheezing

 

TECHNIQUE:  Frontal and lateral views of the chest are obtained.

 

FINDINGS:  There is no focal air space opacity, pleural effusion, or pneumothorax seen.  The cardiac 
silhouette size is within normal limits.   The osseous structures are stable, anterior wedge compress
ion deformity is again noted at the thoracic lumbar junction and there is resulting kyphosis. Thoraci
c spondylosis is again seen. Retrocardiac density is consistent with hiatal hernia. Arthropathy is no
simi in the right shoulder. Linear strand-like densities at the left lung base likely reflects scarrin
g.

 

IMPRESSION:  No acute cardiopulmonary process. Additional findings above.

## 2020-06-13 ENCOUNTER — HOSPITAL ENCOUNTER (EMERGENCY)
Dept: HOSPITAL 47 - EC | Age: 80
Discharge: HOME | End: 2020-06-13
Payer: MEDICARE

## 2020-06-13 VITALS
SYSTOLIC BLOOD PRESSURE: 124 MMHG | HEART RATE: 60 BPM | RESPIRATION RATE: 16 BRPM | DIASTOLIC BLOOD PRESSURE: 72 MMHG | TEMPERATURE: 97.9 F

## 2020-06-13 DIAGNOSIS — Z99.89: ICD-10-CM

## 2020-06-13 DIAGNOSIS — F03.90: ICD-10-CM

## 2020-06-13 DIAGNOSIS — Z79.899: ICD-10-CM

## 2020-06-13 DIAGNOSIS — I70.90: ICD-10-CM

## 2020-06-13 DIAGNOSIS — G47.30: ICD-10-CM

## 2020-06-13 DIAGNOSIS — M19.90: ICD-10-CM

## 2020-06-13 DIAGNOSIS — Z87.891: ICD-10-CM

## 2020-06-13 DIAGNOSIS — Z96.651: ICD-10-CM

## 2020-06-13 DIAGNOSIS — Z96.642: ICD-10-CM

## 2020-06-13 DIAGNOSIS — M48.54XA: ICD-10-CM

## 2020-06-13 DIAGNOSIS — I10: ICD-10-CM

## 2020-06-13 DIAGNOSIS — Z85.46: ICD-10-CM

## 2020-06-13 DIAGNOSIS — K21.9: ICD-10-CM

## 2020-06-13 DIAGNOSIS — K92.1: ICD-10-CM

## 2020-06-13 DIAGNOSIS — Z79.51: ICD-10-CM

## 2020-06-13 DIAGNOSIS — J44.9: Primary | ICD-10-CM

## 2020-06-13 DIAGNOSIS — Z92.21: ICD-10-CM

## 2020-06-13 DIAGNOSIS — G25.81: ICD-10-CM

## 2020-06-13 DIAGNOSIS — K44.9: ICD-10-CM

## 2020-06-13 LAB
ALBUMIN SERPL-MCNC: 4 G/DL (ref 3.5–5)
ALP SERPL-CCNC: 69 U/L (ref 38–126)
ALT SERPL-CCNC: 18 U/L (ref 4–49)
ANION GAP SERPL CALC-SCNC: 7 MMOL/L
APTT BLD: 23.7 SEC (ref 22–30)
AST SERPL-CCNC: 26 U/L (ref 17–59)
BASOPHILS # BLD AUTO: 0 K/UL (ref 0–0.2)
BASOPHILS NFR BLD AUTO: 1 %
BUN SERPL-SCNC: 18 MG/DL (ref 9–20)
CALCIUM SPEC-MCNC: 9 MG/DL (ref 8.4–10.2)
CHLORIDE SERPL-SCNC: 105 MMOL/L (ref 98–107)
CO2 SERPL-SCNC: 27 MMOL/L (ref 22–30)
EOSINOPHIL # BLD AUTO: 0.5 K/UL (ref 0–0.7)
EOSINOPHIL NFR BLD AUTO: 13 %
ERYTHROCYTE [DISTWIDTH] IN BLOOD BY AUTOMATED COUNT: 4.46 M/UL (ref 4.3–5.9)
ERYTHROCYTE [DISTWIDTH] IN BLOOD: 12.8 % (ref 11.5–15.5)
GLUCOSE SERPL-MCNC: 137 MG/DL (ref 74–99)
HCT VFR BLD AUTO: 40.3 % (ref 39–53)
HGB BLD-MCNC: 12.9 GM/DL (ref 13–17.5)
INR PPP: 1 (ref ?–1.2)
LYMPHOCYTES # SPEC AUTO: 1 K/UL (ref 1–4.8)
LYMPHOCYTES NFR SPEC AUTO: 24 %
MAGNESIUM SPEC-SCNC: 2.1 MG/DL (ref 1.6–2.3)
MCH RBC QN AUTO: 29 PG (ref 25–35)
MCHC RBC AUTO-ENTMCNC: 32.1 G/DL (ref 31–37)
MCV RBC AUTO: 90.4 FL (ref 80–100)
MONOCYTES # BLD AUTO: 0.2 K/UL (ref 0–1)
MONOCYTES NFR BLD AUTO: 6 %
NEUTROPHILS # BLD AUTO: 2.2 K/UL (ref 1.3–7.7)
NEUTROPHILS NFR BLD AUTO: 55 %
PH UR: 5.5 [PH] (ref 5–8)
PLATELET # BLD AUTO: 143 K/UL (ref 150–450)
POTASSIUM SERPL-SCNC: 4.1 MMOL/L (ref 3.5–5.1)
PROT SERPL-MCNC: 6.7 G/DL (ref 6.3–8.2)
PT BLD: 10.6 SEC (ref 9–12)
SODIUM SERPL-SCNC: 139 MMOL/L (ref 137–145)
SP GR UR: >1.05 (ref 1–1.03)
UROBILINOGEN UR QL STRIP: 2 MG/DL (ref ?–2)
WBC # BLD AUTO: 4.1 K/UL (ref 3.8–10.6)

## 2020-06-13 PROCEDURE — 83735 ASSAY OF MAGNESIUM: CPT

## 2020-06-13 PROCEDURE — 85730 THROMBOPLASTIN TIME PARTIAL: CPT

## 2020-06-13 PROCEDURE — 85610 PROTHROMBIN TIME: CPT

## 2020-06-13 PROCEDURE — 84484 ASSAY OF TROPONIN QUANT: CPT

## 2020-06-13 PROCEDURE — 96361 HYDRATE IV INFUSION ADD-ON: CPT

## 2020-06-13 PROCEDURE — 82272 OCCULT BLD FECES 1-3 TESTS: CPT

## 2020-06-13 PROCEDURE — 36415 COLL VENOUS BLD VENIPUNCTURE: CPT

## 2020-06-13 PROCEDURE — 83690 ASSAY OF LIPASE: CPT

## 2020-06-13 PROCEDURE — 99285 EMERGENCY DEPT VISIT HI MDM: CPT

## 2020-06-13 PROCEDURE — 85025 COMPLETE CBC W/AUTO DIFF WBC: CPT

## 2020-06-13 PROCEDURE — 96374 THER/PROPH/DIAG INJ IV PUSH: CPT

## 2020-06-13 PROCEDURE — 83605 ASSAY OF LACTIC ACID: CPT

## 2020-06-13 PROCEDURE — 74177 CT ABD & PELVIS W/CONTRAST: CPT

## 2020-06-13 PROCEDURE — 81003 URINALYSIS AUTO W/O SCOPE: CPT

## 2020-06-13 PROCEDURE — 71046 X-RAY EXAM CHEST 2 VIEWS: CPT

## 2020-06-13 PROCEDURE — 93005 ELECTROCARDIOGRAM TRACING: CPT

## 2020-06-13 PROCEDURE — 94640 AIRWAY INHALATION TREATMENT: CPT

## 2020-06-13 PROCEDURE — 80053 COMPREHEN METABOLIC PANEL: CPT

## 2020-06-13 NOTE — CT
EXAMINATION TYPE: CT abdomen pelvis w con

 

DATE OF EXAM: 6/13/2020

 

COMPARISON:

 

HISTORY: Blood in stool and hematuria

 

CT DLP: 1424.3 mGycm

Automated exposure control for dose reduction was used.

 

CONTRAST: 

Performed with IV Contrast, patient injected with 100 mL of Isovue 300.

 

There is some patchy atelectasis at the lung bases. Heart size is fairly normal. There is no pericard
ial effusion. There is large fat density mass involving the posterior mediastinum displacing the esop
hagus to the left side and anteriorly. The mass measures 12 x 6 x 13 cm. This is a lipoma extending t
hrough the diaphragmatic hiatus and contiguous with the mesenteric fat.

 

Liver shows no no dilated ducts. There is one similar cyst in the inferior right lobe of the liver. G
allbladder appears normal. The spleen is intact. Stomach has normal size and contour. There is no shad
dence of pancreatic mass.

 

There is no adrenal mass. The kidneys have normal size and contour. There is no hydronephrosis. Urete
rs are not dilated. There is no retroperitoneal adenopathy. There is right hip prosthesis. Bladder di
stends smoothly. There is no evidence of a pelvic mass. There is no inguinal hernia. There is no free
 fluid in the pelvis. Appendix appears normal.

 

There is no mesenteric edema. There is no ascites or free air. There is no evidence of bowel obstruct
ion. There is anterior wedging of T12 vertebra with 40% loss of height. There is multilevel moderate 
spondylotic changes in the lumbar spine with vacuum disc and spur formation. The bony pelvis appears 
intact.

 

Delayed images show normal renal excretion.

 

IMPRESSION:

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
e lipoma.

 

Mild atherosclerotic vascular disease. Mild lumbar levoscoliosis and multilevel spondylotic changes. 
Old  compression fracture of T12.

## 2020-06-13 NOTE — XR
EXAMINATION TYPE: XR chest 2V

 

DATE OF EXAM: 6/13/2020

 

COMPARISON: 6/12/2020

 

HISTORY: Wheezing. Productive cough.

 

TECHNIQUE: 2 views

 

FINDINGS: There is no heart failure nor confluent pneumonic infiltrate. Heart size is normal. Costoph
renic angles are clear. There are chest leads. Bony thorax is intact. There is posterior mediastinal 
mass related to large lipoma evident on the CT scan today at the diaphragmatic hiatus.

 

IMPRESSION: No active cardiopulmonary disease. No change.

## 2020-06-13 NOTE — ED
General Adult HPI





- General


Source: patient, RN notes reviewed, old records reviewed


Mode of arrival: ambulatory


Limitations: no limitations





<Rodrigo Perry - Last Filed: 06/13/20 21:25>





<Michela Kebede - Last Filed: 06/22/20 00:01>





- General


Chief complaint: GI Bleed


Stated complaint: side pain/blood in stool & urine


Time Seen by Provider: 06/13/20 17:47





- History of Present Illness


Initial comments: 


79-year-old male patient past history significant for COPD, hypertension, 

dementia, prostate cancer in remission presents to ED for evaluation of 

abdominal pain with blood in stool.  Patient reports that yesterday he 

experiencing periumbilical abdominal pain.  Reports this lasted approximately 30

minutes.  Reports that after that he then had dark red blood in his stool.  

Patient reports that today he is not having abdominal pain or dark red blood in 

his stool.  He does report that he has COPD and for the last 2 months or so he 

believes that his breathing has somewhat worsened over this timeframe.  Denies 

any chest pain.  Denies any other complaints at this time.





Systemic: Pt denies fatigue, fever/chills, rash. Pt denies weakness, night 

sweats, weight loss. 


Neuro: Pt denies headache, visual disturbances, syncope or pre-syncope.


HEENT: Pt denies ocular discharge or irritation, otalgia, rhinorrhea, 

pharyngitis or notable lymphadenopathy. 


Cardiopulmonary: Pt denies chest pain, heart palpitations, dyspnea on exertion. 




Abdominal/GI: Pt denies n/v/d. 


: Pt denies dysuria, burning w/ urination, frequency/urgency. Denies new onset

urinary or bowel incontinence.  


MSK: Pt denies myalgia, loss of strength or function in extremities. 


Neuro: Pt denies new onset weakness, paresthesias. 


 (Rodrigo Perry)





- Related Data


                                Home Medications











 Medication  Instructions  Recorded  Confirmed


 


Doxazosin [Cardura] 2 mg PO HS 12/12/15 01/31/19


 


Gabapentin [Neurontin] 300 mg PO TID 12/12/15 01/31/19


 


Albuterol Inhaler (Mhu) [Ventolin 1 - 2 puff INHALATION RT-Q6H PRN 04/19/16 01/29/19





Hfa Inhaler (Mhu)]   


 


Furosemide [Lasix] 40 mg PO DAILY 04/19/16 01/31/19


 


Omeprazole 40 mg PO DAILY 04/19/16 01/31/19


 


Pramipexole [Mirapex] 1 mg PO TID 04/19/16 01/31/19


 


DULoxetine HCL [Cymbalta] 60 mg PO BID 11/21/16 01/31/19


 


Potassium Chloride ER [K-Dur 10] 10 meq PO HS 11/21/16 01/31/19


 


Ferrous Sulfate [Iron] 325 mg PO DAILY 12/07/18 01/31/19


 


Memantine [Namenda] 10 mg PO BID 12/07/18 01/31/19


 


Pravastatin Sodium [Pravachol] 20 mg PO HS 12/07/18 01/31/19


 


traMADol HCl [Ultram] 50 mg PO Q6HR PRN 12/07/18 01/29/19








                                  Previous Rx's











 Medication  Instructions  Recorded


 


HYDROcodone/APAP 5-325MG [Norco 1 - 2 tab PO Q4-6H PRN #84 tab 12/14/18





5-325]  











                                    Allergies











Allergy/AdvReac Type Severity Reaction Status Date / Time


 


No Known Allergies Allergy   Verified 06/13/20 17:41














Review of Systems


ROS Other: All systems not noted in ROS Statement are negative.





<Rodrigo Perry - Last Filed: 06/13/20 21:25>


ROS Other: All systems not noted in ROS Statement are negative.





<Michela Kebede - Last Filed: 06/22/20 00:01>


ROS Statement: 


Those systems with pertinent positive or pertinent negative responses have been 

documented in the HPI.








Past Medical History


Past Medical History: Cancer, COPD, Dementia, GERD/Reflux, Hearing Disorder / 

Deafness, Hypertension, Liver Disease, Memory Impairment, Osteoarthritis (OA), 

Prostate Disorder, Sleep Apnea/CPAP/BIPAP


Additional Past Medical History / Comment(s): Hepatitis B, Rotator cuff 

fracture, Prostate Cancer with Chemotherapy (ended in April 2015), Restless leg 

syndrome


History of Any Multi-Drug Resistant Organisms: None Reported


Past Surgical History: Back Surgery, Joint Replacement, Orthopedic Surgery, 

Tonsillectomy


Additional Past Surgical History / Comment(s): 11/21/16  Total R knee 

arthroplasty.    Other surgical hx:  ORIF LFA FX.  ORIF RT Wrist, Fusion.  RT H

IP FX 12/2015, HAD TOTAL HIP REPLACEMENT.  Cervical fusion.  LT TOTAL KNEE 

4/25/16.


Past Anesthesia/Blood Transfusion Reactions: No Reported Reaction


Past Psychological History: No Psychological Hx Reported


Smoking Status: Former smoker


Past Drug Use History: Marijuana





- Past Family History


  ** Father


History Unknown: Yes


Family Medical History: No Reported History





  ** Brother(s)


Family Medical History: Cancer





  ** Mother


Family Medical History: Cancer


Additional Family Medical History / Comment(s): Skin CA





  ** Sister(s)


Family Medical History: Cancer, Diabetes Mellitus, Deep Vein Thrombosis (DVT)


Additional Family Medical History / Comment(s): Colon Cancer





<Rodrigo Perry - Last Filed: 06/13/20 21:25>





General Exam


Limitations: no limitations





<Rodrigo Perry - Last Filed: 06/13/20 21:25>





- General Exam Comments


Initial Comments: 








Constitutional: NAD, AOX3, Pt has pleasant affect. 


HEENT: NC/AT, trachea midline, neck supple, no lymphadenopathy. Posterior 

pharynx non erythematous, without exudates. External ears appear normal, without

discharge. Mucous membranes moist. Eyes PERRLA, EOM intact. There is no scleral 

icterus. No pallor noted. 


Cardiopulmonary: RRR, no murmurs, rubs or gallops, no JVD noted.  Diminished 

breath sounds are noted. Lungs are CTAB. No peripheral edema. 


Abdominal exam: Abdomen soft and non-distended. Abdomen non-tender to palpation 

in all 4 quadrants. Bowel sounds active in LLQ. No hepatosplenomegaly. No 

ecchymosis


Neuro: CN II-XII grossly intact. No nuchal rigidity. No raccon eyes, no shin 

sign, no hemotympanum. No cervical spinal tenderness. 


MSK: No posterior calf tenderness bilaterally, homans sign negative bilaterally.

Posterior tibialis and radial pulse +2 bilaterally. Sensation intact in upper 

and lower extremities. Full active ROM in upper and lower extremities, 5/5 

stregnth. 





 (Rodrigo Perry)





Course





                                   Vital Signs











  06/13/20 06/13/20 06/13/20





  17:36 19:00 19:20


 


Temperature 97.8 F  


 


Pulse Rate 83 65 64


 


Respiratory 18  18





Rate   


 


Blood Pressure 114/61 121/81 110/75


 


O2 Sat by Pulse 90 L 91 L 93 L





Oximetry   














  06/13/20 06/13/20 06/13/20





  19:27 19:37 20:00


 


Temperature   


 


Pulse Rate 64 66 65


 


Respiratory   16





Rate   


 


Blood Pressure   138/97


 


O2 Sat by Pulse   94 L





Oximetry   














  06/13/20 06/13/20





  21:00 21:46


 


Temperature  97.9 F


 


Pulse Rate 64 60


 


Respiratory 18 16





Rate  


 


Blood Pressure 145/87 124/72


 


O2 Sat by Pulse 97 95





Oximetry  














Medical Decision Making





- Lab Data


Result diagrams: 


                                 06/13/20 18:05





                                 06/13/20 18:05





- EKG Data


-: EKG Interpreted by Me (and Dr. Kebede )





<Rodrigo Perry - Last Filed: 06/13/20 21:25>





- Lab Data


Result diagrams: 


                                 06/13/20 18:05





                                 06/13/20 18:05





<Michela Kebede - Last Filed: 06/22/20 00:01>





- Medical Decision Making








79-year-old male patient past history significant for COPD, hypertension, 

dementia, prostate cancer in remission presents to ED for evaluation of 

abdominal pain with blood in stool.  Patient reports that yesterday he 

experiencing periumbilical abdominal pain.  Reports this lasted approximately 30

minutes.  Reports that after that he then had dark red blood in his stool.  

Patient reports that today he is not having abdominal pain or dark red blood in 

his stool.  He does report that he has COPD and for the last 2 months or so he 

believes that his breathing has somewhat worsened over this timeframe.  Denies 

any chest pain.  Denies any other complaints at this time. Pt VSS, afebrile. 

Physical exam displayed: Nontender abdomen.  Breath sounds are slightly 

diminished.  Occult blood did not reveal any bloody or melanotic stools.  

Laboratory investigations her pain is not noncompressive.  Hemoglobin is 12.9 

and is improved from patient's baseline.  Occult blood is negative.  EKG is 

nonischemic troponin is negative.  Chest x-ray reveals no acute cardiopulmonary 

disease, no change.  CT abdomen and pelvis displayed posterior diaphragmatic 

hydration hernia which contains large fat density with appearance of a simple 

lipoma.  Mild atherosclerotic vascular disease.  Mild lumbar levoscoliosis 

multilevel spondylar changes.  Old compression fracture of T12.  Patient is in 

no acute distress.  Patient breathing improved after a breathing treatment. Pt 

reports that he has adequate breathing treatments at home. Patient discharged 

with outpatient follow-up with both primary care provider, Gen. surgery and 

pulmonology.  Case discussed with Dr. Solis. 








 (Rodrigo Perry)


I was available for consultation in the emergency department.  The history and 

physical exam were done by the midlevel provider. I was consulted for this 

patients care. I reviewed the case with the midlevel provider and based on 

their presentation of the patient, I agree with the assessment, medical decision

making and plan of care as documented. The case was discussed with myself, not 

Dr. Solis.


Chart was dictated using Dragon dictation software.  Attempts were made to 

correct any dictation errors however some typographical errors may persist. 


Patient was seen during a national state of emergency due to the Covid-19 

pandemic. 


 (Michela Kebede)





- Lab Data





                                   Lab Results











  06/13/20 06/13/20 06/13/20 Range/Units





  18:05 18:05 18:05 


 


WBC  4.1    (3.8-10.6)  k/uL


 


RBC  4.46    (4.30-5.90)  m/uL


 


Hgb  12.9 L    (13.0-17.5)  gm/dL


 


Hct  40.3    (39.0-53.0)  %


 


MCV  90.4    (80.0-100.0)  fL


 


MCH  29.0    (25.0-35.0)  pg


 


MCHC  32.1    (31.0-37.0)  g/dL


 


RDW  12.8    (11.5-15.5)  %


 


Plt Count  143 L    (150-450)  k/uL


 


Neutrophils %  55    %


 


Lymphocytes %  24    %


 


Monocytes %  6    %


 


Eosinophils %  13    %


 


Basophils %  1    %


 


Neutrophils #  2.2    (1.3-7.7)  k/uL


 


Lymphocytes #  1.0    (1.0-4.8)  k/uL


 


Monocytes #  0.2    (0-1.0)  k/uL


 


Eosinophils #  0.5    (0-0.7)  k/uL


 


Basophils #  0.0    (0-0.2)  k/uL


 


PT   10.6   (9.0-12.0)  sec


 


INR   1.0   (<1.2)  


 


APTT   23.7   (22.0-30.0)  sec


 


Sodium    139  (137-145)  mmol/L


 


Potassium    4.1  (3.5-5.1)  mmol/L


 


Chloride    105  ()  mmol/L


 


Carbon Dioxide    27  (22-30)  mmol/L


 


Anion Gap    7  mmol/L


 


BUN    18  (9-20)  mg/dL


 


Creatinine    0.89  (0.66-1.25)  mg/dL


 


Est GFR (CKD-EPI)AfAm    >90  (>60 ml/min/1.73 sqM)  


 


Est GFR (CKD-EPI)NonAf    80  (>60 ml/min/1.73 sqM)  


 


Glucose    137 H  (74-99)  mg/dL


 


Plasma Lactic Acid Martin     (0.7-2.0)  mmol/L


 


Calcium    9.0  (8.4-10.2)  mg/dL


 


Magnesium    2.1  (1.6-2.3)  mg/dL


 


Total Bilirubin    0.5  (0.2-1.3)  mg/dL


 


AST    26  (17-59)  U/L


 


ALT    18  (4-49)  U/L


 


Alkaline Phosphatase    69  ()  U/L


 


Troponin I     (0.000-0.034)  ng/mL


 


Total Protein    6.7  (6.3-8.2)  g/dL


 


Albumin    4.0  (3.5-5.0)  g/dL


 


Lipase    41  ()  U/L


 


Urine Color     


 


Urine Appearance     (Clear)  


 


Urine pH     (5.0-8.0)  


 


Ur Specific Gravity     (1.001-1.035)  


 


Urine Protein     (Negative)  


 


Urine Glucose (UA)     (Negative)  


 


Urine Ketones     (Negative)  


 


Urine Blood     (Negative)  


 


Urine Nitrite     (Negative)  


 


Urine Bilirubin     (Negative)  


 


Urine Urobilinogen     (<2.0)  mg/dL


 


Ur Leukocyte Esterase     (Negative)  


 


Stool Occult Blood     (Negative)  














  06/13/20 06/13/20 06/13/20 Range/Units





  18:05 18:05 18:30 


 


WBC     (3.8-10.6)  k/uL


 


RBC     (4.30-5.90)  m/uL


 


Hgb     (13.0-17.5)  gm/dL


 


Hct     (39.0-53.0)  %


 


MCV     (80.0-100.0)  fL


 


MCH     (25.0-35.0)  pg


 


MCHC     (31.0-37.0)  g/dL


 


RDW     (11.5-15.5)  %


 


Plt Count     (150-450)  k/uL


 


Neutrophils %     %


 


Lymphocytes %     %


 


Monocytes %     %


 


Eosinophils %     %


 


Basophils %     %


 


Neutrophils #     (1.3-7.7)  k/uL


 


Lymphocytes #     (1.0-4.8)  k/uL


 


Monocytes #     (0-1.0)  k/uL


 


Eosinophils #     (0-0.7)  k/uL


 


Basophils #     (0-0.2)  k/uL


 


PT     (9.0-12.0)  sec


 


INR     (<1.2)  


 


APTT     (22.0-30.0)  sec


 


Sodium     (137-145)  mmol/L


 


Potassium     (3.5-5.1)  mmol/L


 


Chloride     ()  mmol/L


 


Carbon Dioxide     (22-30)  mmol/L


 


Anion Gap     mmol/L


 


BUN     (9-20)  mg/dL


 


Creatinine     (0.66-1.25)  mg/dL


 


Est GFR (CKD-EPI)AfAm     (>60 ml/min/1.73 sqM)  


 


Est GFR (CKD-EPI)NonAf     (>60 ml/min/1.73 sqM)  


 


Glucose     (74-99)  mg/dL


 


Plasma Lactic Acid Martin  1.9    (0.7-2.0)  mmol/L


 


Calcium     (8.4-10.2)  mg/dL


 


Magnesium     (1.6-2.3)  mg/dL


 


Total Bilirubin     (0.2-1.3)  mg/dL


 


AST     (17-59)  U/L


 


ALT     (4-49)  U/L


 


Alkaline Phosphatase     ()  U/L


 


Troponin I   <0.012   (0.000-0.034)  ng/mL


 


Total Protein     (6.3-8.2)  g/dL


 


Albumin     (3.5-5.0)  g/dL


 


Lipase     ()  U/L


 


Urine Color     


 


Urine Appearance     (Clear)  


 


Urine pH     (5.0-8.0)  


 


Ur Specific Gravity     (1.001-1.035)  


 


Urine Protein     (Negative)  


 


Urine Glucose (UA)     (Negative)  


 


Urine Ketones     (Negative)  


 


Urine Blood     (Negative)  


 


Urine Nitrite     (Negative)  


 


Urine Bilirubin     (Negative)  


 


Urine Urobilinogen     (<2.0)  mg/dL


 


Ur Leukocyte Esterase     (Negative)  


 


Stool Occult Blood    Negative  (Negative)  














  06/13/20 Range/Units





  20:56 


 


WBC   (3.8-10.6)  k/uL


 


RBC   (4.30-5.90)  m/uL


 


Hgb   (13.0-17.5)  gm/dL


 


Hct   (39.0-53.0)  %


 


MCV   (80.0-100.0)  fL


 


MCH   (25.0-35.0)  pg


 


MCHC   (31.0-37.0)  g/dL


 


RDW   (11.5-15.5)  %


 


Plt Count   (150-450)  k/uL


 


Neutrophils %   %


 


Lymphocytes %   %


 


Monocytes %   %


 


Eosinophils %   %


 


Basophils %   %


 


Neutrophils #   (1.3-7.7)  k/uL


 


Lymphocytes #   (1.0-4.8)  k/uL


 


Monocytes #   (0-1.0)  k/uL


 


Eosinophils #   (0-0.7)  k/uL


 


Basophils #   (0-0.2)  k/uL


 


PT   (9.0-12.0)  sec


 


INR   (<1.2)  


 


APTT   (22.0-30.0)  sec


 


Sodium   (137-145)  mmol/L


 


Potassium   (3.5-5.1)  mmol/L


 


Chloride   ()  mmol/L


 


Carbon Dioxide   (22-30)  mmol/L


 


Anion Gap   mmol/L


 


BUN   (9-20)  mg/dL


 


Creatinine   (0.66-1.25)  mg/dL


 


Est GFR (CKD-EPI)AfAm   (>60 ml/min/1.73 sqM)  


 


Est GFR (CKD-EPI)NonAf   (>60 ml/min/1.73 sqM)  


 


Glucose   (74-99)  mg/dL


 


Plasma Lactic Acid Martin   (0.7-2.0)  mmol/L


 


Calcium   (8.4-10.2)  mg/dL


 


Magnesium   (1.6-2.3)  mg/dL


 


Total Bilirubin   (0.2-1.3)  mg/dL


 


AST   (17-59)  U/L


 


ALT   (4-49)  U/L


 


Alkaline Phosphatase   ()  U/L


 


Troponin I   (0.000-0.034)  ng/mL


 


Total Protein   (6.3-8.2)  g/dL


 


Albumin   (3.5-5.0)  g/dL


 


Lipase   ()  U/L


 


Urine Color  Yellow  


 


Urine Appearance  Clear  (Clear)  


 


Urine pH  5.5  (5.0-8.0)  


 


Ur Specific Gravity  >1.050 H  (1.001-1.035)  


 


Urine Protein  Negative  (Negative)  


 


Urine Glucose (UA)  Negative  (Negative)  


 


Urine Ketones  Negative  (Negative)  


 


Urine Blood  Negative  (Negative)  


 


Urine Nitrite  Negative  (Negative)  


 


Urine Bilirubin  Negative  (Negative)  


 


Urine Urobilinogen  2.0  (<2.0)  mg/dL


 


Ur Leukocyte Esterase  Negative  (Negative)  


 


Stool Occult Blood   (Negative)  














- EKG Data


EKG Comments: 


Ventricular rate 65, painful to 60, QRS 92, QT/QTc 440 06/04/1959.  Sensory and 

first degree AV block.  Cannot rule out anterior infarct age undetermined.  

Abnormal EKG.  No acute ST elevations or depressions noted.  No concern for 

acute ischemia at this time.


 (Rodrigo Perry)





Disposition


Is patient prescribed a controlled substance at d/c from ED?: No





<Rodrigo Perry - Last Filed: 06/13/20 21:25>





<Michela Kebede - Last Filed: 06/22/20 00:01>


Clinical Impression: 


 Abdominal pain, COPD (chronic obstructive pulmonary disease)





Disposition: HOME SELF-CARE


Condition: Stable


Instructions (If sedation given, give patient instructions):  COPD (Chronic 

Obstructive Pulmonary Disease) (ED), Abdominal Pain (ED)


Additional Instructions: 


Follow-up with primary care provider as well as general surgeon and previously 

established pulmonologist.  Continue to use breathing treatments at home as need

ed.  Return to ER if condition worsens in any way.


Referrals: 


Etelvina Vázquez MD [Primary Care Provider] - 1-2 days


Silvana Reyes DO [Doctor of Osteopathic Medicine] - 1-2 days

## 2022-06-30 ENCOUNTER — HOSPITAL ENCOUNTER (EMERGENCY)
Dept: HOSPITAL 47 - EC | Age: 82
Discharge: HOME | End: 2022-06-30
Payer: MEDICARE

## 2022-06-30 VITALS — HEART RATE: 84 BPM | RESPIRATION RATE: 20 BRPM | TEMPERATURE: 98.5 F

## 2022-06-30 VITALS — DIASTOLIC BLOOD PRESSURE: 84 MMHG | SYSTOLIC BLOOD PRESSURE: 134 MMHG

## 2022-06-30 DIAGNOSIS — N48.89: Primary | ICD-10-CM

## 2022-06-30 DIAGNOSIS — J44.9: ICD-10-CM

## 2022-06-30 DIAGNOSIS — Z87.891: ICD-10-CM

## 2022-06-30 DIAGNOSIS — I10: ICD-10-CM

## 2022-06-30 DIAGNOSIS — Z79.83: ICD-10-CM

## 2022-06-30 DIAGNOSIS — K21.9: ICD-10-CM

## 2022-06-30 NOTE — ED
General Adult HPI





- General


Chief complaint: Recheck/Abnormal Lab/Rx


Stated complaint: Catheter Issue, Blood in Urine


Time Seen by Provider: 06/30/22 11:29


Source: patient, EMS


Mode of arrival: EMS





- History of Present Illness


Initial comments: 


Patient is an 81-year-old  male with multiple chronic health conditions

who is currently on hospice through [facility]. He was sent to the 

emergency room by the hospice nurse due to penile swelling. He has had a West 

catheter in place for 4 days now without any purulent discharge at this time 

though he did have some mild discharge on initial insertion several days ago. He

has been afebrile and denies any penile pain. He is a poor historian and alert 

1 only and he is accompanied by his wife who is also a poor historian though 

she is in O 3. His wife is still agreeable to him being on hospice and DO NOT 

RESUSCITATE. 





- Related Data


                                Home Medications











 Medication  Instructions  Recorded  Confirmed


 


Doxazosin [Cardura] 2 mg PO HS 12/12/15 01/31/19


 


Gabapentin [Neurontin] 300 mg PO TID 12/12/15 01/31/19


 


Albuterol Inhaler [Ventolin Hfa 1 - 2 puff INHALATION RT-Q6H PRN 04/19/16 01 /29/19





Inhaler]   


 


Furosemide [Lasix] 40 mg PO DAILY 04/19/16 01/31/19


 


Omeprazole 40 mg PO DAILY 04/19/16 01/31/19


 


Pramipexole [Mirapex] 1 mg PO TID 04/19/16 01/31/19


 


DULoxetine HCL [Cymbalta] 60 mg PO BID 11/21/16 01/31/19


 


Potassium Chloride ER [K-Dur 10] 10 meq PO HS 11/21/16 01/31/19


 


Ferrous Sulfate [Iron] 325 mg PO DAILY 12/07/18 01/31/19


 


Memantine [Namenda] 10 mg PO BID 12/07/18 01/31/19


 


Pravastatin Sodium [Pravachol] 20 mg PO HS 12/07/18 01/31/19


 


traMADol HCl [Ultram] 50 mg PO Q6HR PRN 12/07/18 01/29/19








                                  Previous Rx's











 Medication  Instructions  Recorded


 


HYDROcodone/APAP 5-325MG [Norco 1 - 2 tab PO Q4-6H PRN #84 tab 12/14/18





5325]  











                                    Allergies











Allergy/AdvReac Type Severity Reaction Status Date / Time


 


No Known Allergies Allergy   Verified 06/13/20 17:41














Review of Systems


ROS Statement: 


Those systems with pertinent positive or pertinent negative responses have been 

documented in the HPI.





ROS Other: All systems not noted in ROS Statement are negative.





Past Medical History


Past Medical History: Cancer, COPD, Dementia, GERD/Reflux, Hearing Disorder / 

Deafness, Hypertension, Liver Disease, Memory Impairment, Osteoarthritis (OA), 

Prostate Disorder, Sleep Apnea/CPAP/BIPAP


Additional Past Medical History / Comment(s): Hepatitis B, Rotator cuff 

fracture, Prostate Cancer with Chemotherapy (ended in April 2015), Restless leg 

syndrome


History of Any Multi-Drug Resistant Organisms: None Reported


Past Surgical History: Back Surgery, Joint Replacement, Orthopedic Surgery, 

Tonsillectomy


Additional Past Surgical History / Comment(s): 11/21/16  Total R knee 

arthroplasty.    Other surgical hx:  ORIF LFA FX.  ORIF RT Wrist, Fusion.  RT 

HIP FX 12/2015, HAD TOTAL HIP REPLACEMENT.  Cervical fusion.  LT TOTAL KNEE 

4/25/16.


Past Anesthesia/Blood Transfusion Reactions: No Reported Reaction


Past Psychological History: No Psychological Hx Reported


Smoking Status: Former smoker


Past Alcohol Use History: None Reported


Past Drug Use History: Marijuana





- Past Family History


  ** Father


History Unknown: Yes


Family Medical History: No Reported History





  ** Brother(s)


Family Medical History: Cancer





  ** Mother


Family Medical History: Cancer


Additional Family Medical History / Comment(s): Skin CA





  ** Sister(s)


Family Medical History: Cancer, Diabetes Mellitus, Deep Vein Thrombosis (DVT)


Additional Family Medical History / Comment(s): Colon Cancer





General Exam


General appearance: alert, in no apparent distress


Head exam: Present: atraumatic, normocephalic


 exam: Present: other (west catheter intact).  Absent: testicular tenderness,

urethral discharge, scrotal swelling, circumcision


External exam: Present: swelling


Neurological exam: Present: alert


  ** Expanded


Patient oriented to: Present: person.  Absent: place, time


Cranial nerves: Facial Palsy with Forehead Movement: Abnormal Right


Psychiatric exam: Present: normal affect, normal mood


Skin exam: Present: other (Mild erythema at base of glans penis without evidence

of infections)





Course


                                   Vital Signs











  06/30/22





  11:29


 


Temperature 98.5 F


 


Pulse Rate 84


 


Respiratory 20





Rate 


 


Blood Pressure 115/86


 


O2 Sat by Pulse 96





Oximetry 














Medical Decision Making





- Medical Decision Making


Patient and spouse were advised that as a hospice patient diagnostic testing is 

not performed and as he is not actively dying or in need of respite care no 

hospital admission is needed. No indication for diagnostic testing. Penile 

swelling noted likely secondary to poor technique from West catheter insertion 

and lack of foreskin retraction after insertion. Unable to reduce however no 

evidence of ischemia to penile tip. Will arrange for transport back home with 

continued hospice care.





Case discussed with Dr. Barrientos





Disposition


Clinical Impression: 


 Penile edema





Disposition: HOME SELF-CARE


Condition: Stable


Instructions (If sedation given, give patient instructions):  West Catheter 

Placement and Care (ED)


Additional Instructions: 


Please follow-up with hospice nurse. To help reduce penile swelling of the penis

with a small pillow or cloth. Please return to the Emergency Department if 

symptoms worsen or any other concerns.


Is patient prescribed a controlled substance at d/c from ED?: No


Referrals: 


None,Stated [Primary Care Provider] - 1-2 days


Time of Disposition: 13:17

## 2023-07-14 NOTE — US
EXAMINATION TYPE: US venous doppler duplex LE LT

 

DATE OF EXAM: 11/21/2018 4:29 PM

 

COMPARISON: US 2016

 

CLINICAL HISTORY: I80.9 Phlebitis and thrombophlebitis. Left leg swelling

 

SIDE PERFORMED: Left  

 

TECHNIQUE:  The lower extremity deep venous system is examined utilizing real time linear array sonog
ham with graded compression, doppler sonography and color-flow sonography.

 

VESSELS IMAGED:

External Iliac Vein (EIV)

Common Femoral Vein

Deep Femoral Vein

Greater Saphenous Vein *

Femoral Vein

Popliteal Vein

Small Saphenous Vein *

Proximal Calf Veins

(* superficial vessels)

 

 

 

Left Leg:  Appears negative for DVT

 

 

 

IMPRESSION:

1. Left lower extremity ultrasound negative for deep venous thrombosis.
Specialty Care (Routine)...